# Patient Record
Sex: MALE | Race: WHITE | NOT HISPANIC OR LATINO | URBAN - METROPOLITAN AREA
[De-identification: names, ages, dates, MRNs, and addresses within clinical notes are randomized per-mention and may not be internally consistent; named-entity substitution may affect disease eponyms.]

---

## 2017-01-16 RX ORDER — LISINOPRIL 20 MG/1
TABLET ORAL
Qty: 90 TABLET | Refills: 0 | Status: SHIPPED | OUTPATIENT
Start: 2017-01-16 | End: 2017-04-14

## 2017-01-20 ENCOUNTER — WALK IN (OUTPATIENT)
Dept: URGENT CARE | Age: 42
End: 2017-01-20

## 2017-01-20 VITALS
HEART RATE: 78 BPM | SYSTOLIC BLOOD PRESSURE: 120 MMHG | WEIGHT: 285 LBS | BODY MASS INDEX: 37.77 KG/M2 | TEMPERATURE: 98.1 F | OXYGEN SATURATION: 96 % | DIASTOLIC BLOOD PRESSURE: 76 MMHG | HEIGHT: 73 IN | RESPIRATION RATE: 18 BRPM

## 2017-01-20 DIAGNOSIS — S61.412A LACERATION OF LEFT HAND, INITIAL ENCOUNTER: Primary | ICD-10-CM

## 2017-01-20 PROCEDURE — 12041 INTMD RPR N-HF/GENIT 2.5CM/<: CPT | Performed by: FAMILY MEDICINE

## 2017-01-20 PROCEDURE — 99204 OFFICE O/P NEW MOD 45 MIN: CPT | Performed by: FAMILY MEDICINE

## 2017-01-20 RX ORDER — LIDOCAINE HYDROCHLORIDE 10 MG/ML
2 INJECTION, SOLUTION INFILTRATION; PERINEURAL ONCE
Status: COMPLETED | OUTPATIENT
Start: 2017-01-20 | End: 2017-01-20

## 2017-01-20 RX ORDER — BACITRACIN ZINC AND POLYMYXIN B SULFATE 500; 1000 [USP'U]/G; [USP'U]/G
OINTMENT TOPICAL PRN
Status: DISCONTINUED | OUTPATIENT
Start: 2017-01-20 | End: 2017-02-08 | Stop reason: HOSPADM

## 2017-01-20 RX ADMIN — LIDOCAINE HYDROCHLORIDE 20 MG: 10 INJECTION, SOLUTION INFILTRATION; PERINEURAL at 10:22

## 2017-01-20 RX ADMIN — BACITRACIN ZINC AND POLYMYXIN B SULFATE: 500; 1000 OINTMENT TOPICAL at 10:21

## 2017-01-24 ENCOUNTER — TELEPHONE (OUTPATIENT)
Dept: TELEHEALTH | Age: 42
End: 2017-01-24

## 2017-02-11 ENCOUNTER — OFFICE VISIT (OUTPATIENT)
Dept: FAMILY MEDICINE CLINIC | Facility: CLINIC | Age: 42
End: 2017-02-11

## 2017-02-11 VITALS
TEMPERATURE: 99 F | RESPIRATION RATE: 18 BRPM | BODY MASS INDEX: 38.19 KG/M2 | WEIGHT: 282 LBS | DIASTOLIC BLOOD PRESSURE: 84 MMHG | HEIGHT: 72 IN | SYSTOLIC BLOOD PRESSURE: 131 MMHG | HEART RATE: 72 BPM

## 2017-02-11 DIAGNOSIS — Z00.00 ROUTINE HEALTH MAINTENANCE: Primary | ICD-10-CM

## 2017-02-11 DIAGNOSIS — Z80.0 FAMILY HISTORY OF MALIGNANT NEOPLASM OF COLON IN FIRST DEGREE RELATIVE DIAGNOSED WHEN YOUNGER THAN 60 YEARS OF AGE: ICD-10-CM

## 2017-02-11 DIAGNOSIS — R73.09 ELEVATED GLUCOSE: ICD-10-CM

## 2017-02-11 LAB
ALBUMIN SERPL-MCNC: 4.3 G/DL (ref 3.5–4.8)
ALP LIVER SERPL-CCNC: 65 U/L (ref 45–117)
ALT SERPL-CCNC: 62 U/L (ref 17–63)
AST SERPL-CCNC: 24 U/L (ref 15–41)
BILIRUB SERPL-MCNC: 0.5 MG/DL (ref 0.1–2)
BUN BLD-MCNC: 17 MG/DL (ref 8–20)
CALCIUM BLD-MCNC: 9.3 MG/DL (ref 8.3–10.3)
CHLORIDE: 105 MMOL/L (ref 101–111)
CHOLEST SMN-MCNC: 162 MG/DL (ref ?–200)
CO2: 26 MMOL/L (ref 22–32)
CREAT BLD-MCNC: 0.85 MG/DL (ref 0.7–1.3)
ERYTHROCYTE [DISTWIDTH] IN BLOOD BY AUTOMATED COUNT: 11.3 % (ref 11.5–16)
GLUCOSE BLD-MCNC: 140 MG/DL (ref 70–99)
HCT VFR BLD AUTO: 44.9 % (ref 37–53)
HDLC SERPL-MCNC: 38 MG/DL (ref 45–?)
HDLC SERPL: 4.26 {RATIO} (ref ?–4.97)
HGB BLD-MCNC: 15.6 G/DL (ref 13–17)
LDLC SERPL CALC-MCNC: 89 MG/DL (ref ?–130)
M PROTEIN MFR SERPL ELPH: 7.4 G/DL (ref 6.1–8.3)
MCH RBC QN AUTO: 31.8 PG (ref 27–33.2)
MCHC RBC AUTO-ENTMCNC: 34.7 G/DL (ref 31–37)
MCV RBC AUTO: 91.4 FL (ref 80–99)
NONHDLC SERPL-MCNC: 124 MG/DL (ref ?–130)
PLATELET # BLD AUTO: 174 10(3)UL (ref 150–450)
POTASSIUM SERPL-SCNC: 4.2 MMOL/L (ref 3.6–5.1)
RBC # BLD AUTO: 4.91 X10(6)UL (ref 4.3–5.7)
RED CELL DISTRIBUTION WIDTH-SD: 37.5 FL (ref 35.1–46.3)
SODIUM SERPL-SCNC: 136 MMOL/L (ref 136–144)
TRIGLYCERIDES: 177 MG/DL (ref ?–150)
TSI SER-ACNC: 1.04 MIU/ML (ref 0.35–5.5)
VLDL: 35 MG/DL (ref 5–40)
WBC # BLD AUTO: 4.5 X10(3) UL (ref 4–13)

## 2017-02-11 PROCEDURE — 80053 COMPREHEN METABOLIC PANEL: CPT | Performed by: FAMILY MEDICINE

## 2017-02-11 PROCEDURE — 80050 GENERAL HEALTH PANEL: CPT | Performed by: FAMILY MEDICINE

## 2017-02-11 PROCEDURE — 36415 COLL VENOUS BLD VENIPUNCTURE: CPT | Performed by: FAMILY MEDICINE

## 2017-02-11 PROCEDURE — 84443 ASSAY THYROID STIM HORMONE: CPT | Performed by: FAMILY MEDICINE

## 2017-02-11 PROCEDURE — 80061 LIPID PANEL: CPT | Performed by: FAMILY MEDICINE

## 2017-02-11 PROCEDURE — 85027 COMPLETE CBC AUTOMATED: CPT | Performed by: FAMILY MEDICINE

## 2017-02-11 PROCEDURE — 99396 PREV VISIT EST AGE 40-64: CPT | Performed by: FAMILY MEDICINE

## 2017-02-11 PROCEDURE — 83036 HEMOGLOBIN GLYCOSYLATED A1C: CPT | Performed by: FAMILY MEDICINE

## 2017-02-11 NOTE — PROGRESS NOTES
Jana Krause is a 42year old male who presents for a complete physical exam.   HPI:   Pt complains of nothing at this time, he is in need of labs and some screening. He did manage to lose weight but not enough, also he relays a family Hx of colon Ca  In hi Relation Age of Onset   • Diabetes Father    • Cancer Father    • Diabetes Mother       Social History:    Smoking Status: Never Smoker                      Alcohol Use: Yes              Occ: Iron workker. : . Children: 3. Exercise: none. CMP, CBC and TSH . Pt referred for screening colonoscopy. Pt info handouts given for: exercise, low fat diet, testicular self exam and prostate cancer screening. The patient indicates understanding of these issues and agrees to the plan.   The patient is as

## 2017-02-13 ENCOUNTER — TELEPHONE (OUTPATIENT)
Dept: FAMILY MEDICINE CLINIC | Facility: CLINIC | Age: 42
End: 2017-02-13

## 2017-02-13 DIAGNOSIS — R73.09 ELEVATED GLUCOSE: Primary | ICD-10-CM

## 2017-02-13 LAB
EST. AVERAGE GLUCOSE BLD GHB EST-MCNC: 126 MG/DL (ref 68–126)
HBA1C MFR BLD HPLC: 6 % (ref ?–5.7)

## 2017-02-15 ENCOUNTER — TELEPHONE (OUTPATIENT)
Dept: FAMILY MEDICINE CLINIC | Facility: CLINIC | Age: 42
End: 2017-02-15

## 2017-02-15 DIAGNOSIS — R73.09 ELEVATED HEMOGLOBIN A1C: Primary | ICD-10-CM

## 2017-02-15 DIAGNOSIS — E78.2 ELEVATED TRIGLYCERIDES WITH HIGH CHOLESTEROL: ICD-10-CM

## 2017-02-15 NOTE — TELEPHONE ENCOUNTER
----- Message from Berta Cook DO sent at 2/15/2017 10:16 AM CST -----  Notified of results his lipids are better, adn the rest of his labs look good, but his BS is in the prediabetic range, and if he gets his weight down and diets and gets some exercise

## 2017-03-16 RX ORDER — ACYCLOVIR 200 MG/1
CAPSULE ORAL
Qty: 360 CAPSULE | Refills: 3 | Status: SHIPPED | OUTPATIENT
Start: 2017-03-16

## 2017-04-14 RX ORDER — LISINOPRIL 20 MG/1
TABLET ORAL
Qty: 90 TABLET | Refills: 3 | Status: SHIPPED | OUTPATIENT
Start: 2017-04-14 | End: 2018-05-30

## 2017-04-14 NOTE — TELEPHONE ENCOUNTER
Last refill: 1- #90 with 0 refills    Last Visit: 2-    Next Visit: No future appointments. Forward to Dr. Johnny Ayala please advise on refills. Thanks.

## 2017-05-15 ENCOUNTER — TELEPHONE (OUTPATIENT)
Dept: FAMILY MEDICINE CLINIC | Facility: CLINIC | Age: 42
End: 2017-05-15

## 2018-05-31 RX ORDER — LISINOPRIL 20 MG/1
TABLET ORAL
Qty: 30 TABLET | Refills: 0 | Status: SHIPPED | OUTPATIENT
Start: 2018-05-31 | End: 2019-01-18

## 2018-05-31 NOTE — TELEPHONE ENCOUNTER
Last refill on Lisinopril #90 with 3 refills on 4 14 2017   Last OV on 2 11 2017 - Blood pressure 131/84  No future appointments scheduled

## 2018-06-16 ENCOUNTER — OFFICE VISIT (OUTPATIENT)
Dept: FAMILY MEDICINE CLINIC | Facility: CLINIC | Age: 43
End: 2018-06-16

## 2018-06-16 VITALS
BODY MASS INDEX: 37.49 KG/M2 | HEIGHT: 73 IN | TEMPERATURE: 98 F | WEIGHT: 282.88 LBS | HEART RATE: 72 BPM | SYSTOLIC BLOOD PRESSURE: 112 MMHG | RESPIRATION RATE: 18 BRPM | DIASTOLIC BLOOD PRESSURE: 82 MMHG

## 2018-06-16 DIAGNOSIS — Z80.0 FAMILY HISTORY OF MALIGNANT NEOPLASM OF COLON IN FIRST DEGREE RELATIVE DIAGNOSED WHEN YOUNGER THAN 60 YEARS OF AGE: ICD-10-CM

## 2018-06-16 DIAGNOSIS — R73.09 ELEVATED GLUCOSE: ICD-10-CM

## 2018-06-16 DIAGNOSIS — Z00.00 ROUTINE HEALTH MAINTENANCE: Primary | ICD-10-CM

## 2018-06-16 DIAGNOSIS — E55.9 VITAMIN D DEFICIENCY: ICD-10-CM

## 2018-06-16 PROCEDURE — 80061 LIPID PANEL: CPT | Performed by: FAMILY MEDICINE

## 2018-06-16 PROCEDURE — 82306 VITAMIN D 25 HYDROXY: CPT | Performed by: FAMILY MEDICINE

## 2018-06-16 PROCEDURE — 83036 HEMOGLOBIN GLYCOSYLATED A1C: CPT | Performed by: FAMILY MEDICINE

## 2018-06-16 PROCEDURE — 84439 ASSAY OF FREE THYROXINE: CPT | Performed by: FAMILY MEDICINE

## 2018-06-16 PROCEDURE — 36415 COLL VENOUS BLD VENIPUNCTURE: CPT | Performed by: FAMILY MEDICINE

## 2018-06-16 PROCEDURE — 99396 PREV VISIT EST AGE 40-64: CPT | Performed by: FAMILY MEDICINE

## 2018-06-16 PROCEDURE — 80050 GENERAL HEALTH PANEL: CPT | Performed by: FAMILY MEDICINE

## 2018-06-16 NOTE — PROGRESS NOTES
Oumar Quesada is a 36year old male who presents for a complete physical exam.   HPI:   Pt complains of Vandana Sachi is here,for his CPX, he has had an issue with cramping thao[ecially at night and during the day as well , he has been adding salt to his food, and h hyperlipidemia    • Unspecified essential hypertension       Past Surgical History:  No date: HERNIA SURGERY      Comment: R INGUINAL HERNIA AS A KID  No date: OTHER SURGICAL HISTORY      Comment: right shoulder surgery   Family History   Problem Relation back  EXTREMITIES: no cyanosis, clubbing or edema  NEURO: Oriented times three,cranial nerves are intact,motor and sensory are grossly intact    ASSESSMENT AND PLAN:   Huma Abdulkadir is a 36year old male who presents for a complete physical exam.  Pt's bandar

## 2018-06-16 NOTE — PROGRESS NOTES
1 mint . , 1 gold, 1 lavender tube collected from L AC using straight needle and 1 attempt    Pt tolerated and was sent home in stable condition

## 2018-06-18 ENCOUNTER — TELEPHONE (OUTPATIENT)
Dept: FAMILY MEDICINE CLINIC | Facility: CLINIC | Age: 43
End: 2018-06-18

## 2018-06-18 DIAGNOSIS — R73.09 ELEVATED GLUCOSE: Primary | ICD-10-CM

## 2018-06-18 DIAGNOSIS — E78.5 ELEVATED LIPIDS: ICD-10-CM

## 2018-06-18 NOTE — TELEPHONE ENCOUNTER
----- Message from Audra Egan DO sent at 6/18/2018  5:45 PM CDT -----  Can notify Ann Linda that his BS is creeping up just a bit more than it was last year, along with that so is his TG, last year 177, this year 215. The 2 go hand in hand.  I know he  had l

## 2018-06-19 NOTE — TELEPHONE ENCOUNTER
Pt was advised of results- verbalized understanding. PT states at this time he would rather not take medication but work on Diet and exercise.     Future orders placed

## 2018-07-21 ENCOUNTER — LAB ENCOUNTER (OUTPATIENT)
Dept: LAB | Age: 43
End: 2018-07-21
Attending: FAMILY MEDICINE
Payer: COMMERCIAL

## 2018-07-21 ENCOUNTER — OFFICE VISIT (OUTPATIENT)
Dept: FAMILY MEDICINE CLINIC | Facility: CLINIC | Age: 43
End: 2018-07-21
Payer: COMMERCIAL

## 2018-07-21 VITALS
HEART RATE: 86 BPM | SYSTOLIC BLOOD PRESSURE: 118 MMHG | RESPIRATION RATE: 12 BRPM | OXYGEN SATURATION: 96 % | BODY MASS INDEX: 36 KG/M2 | DIASTOLIC BLOOD PRESSURE: 82 MMHG | TEMPERATURE: 98 F | WEIGHT: 275 LBS

## 2018-07-21 DIAGNOSIS — R19.7 DIARRHEA OF PRESUMED INFECTIOUS ORIGIN: ICD-10-CM

## 2018-07-21 DIAGNOSIS — R11.0 NAUSEA: ICD-10-CM

## 2018-07-21 DIAGNOSIS — R50.9 FEVER, UNSPECIFIED FEVER CAUSE: ICD-10-CM

## 2018-07-21 DIAGNOSIS — R19.7 DIARRHEA OF PRESUMED INFECTIOUS ORIGIN: Primary | ICD-10-CM

## 2018-07-21 PROCEDURE — 87507 IADNA-DNA/RNA PROBE TQ 12-25: CPT

## 2018-07-21 PROCEDURE — 99214 OFFICE O/P EST MOD 30 MIN: CPT | Performed by: FAMILY MEDICINE

## 2018-07-21 RX ORDER — LISINOPRIL 20 MG/1
20 TABLET ORAL DAILY
COMMUNITY
End: 2019-06-17

## 2018-07-21 RX ORDER — METRONIDAZOLE 500 MG/1
500 TABLET ORAL 3 TIMES DAILY
Qty: 30 TABLET | Refills: 0 | Status: SHIPPED | OUTPATIENT
Start: 2018-07-21 | End: 2018-07-31

## 2018-07-21 NOTE — PROGRESS NOTES
Vane Sin is a 39year old male. HPI:   Has had diarrhea for 2 weeks now, did not eat at [facility], but has been eating a lot of salads, had a soft stool this AM, nut no blood ion the stool, has been using probiotics for the diarrhea.  No blood in the rashes,no suspicious lesions  HEENT: atraumatic, normocephalic,ears and throat are clear  NECK: supple,no adenopathy,no bruits  LUNGS: clear to auscultation  CARDIO: RRR without murmur  GI: active  BS's,no masses, HSM generalized  tenderness  EXTREMITIES:

## 2018-07-22 LAB
ADENOVIRUS F 40/41 PCR: NEGATIVE
ASTROVIRUS PCR: NEGATIVE
C CAYETANENSIS DNA SPEC QL NAA+PROBE: POSITIVE
C. DIFFICILE TOXIN A/B PCR: NEGATIVE
CAMPY SP DNA.DIARRHEA STL QL NAA+PROBE: NEGATIVE
CRYPTOSP DNA SPEC QL NAA+PROBE: NEGATIVE
EAEC PAA PLAS AGGR+AATA ST NAA+NON-PRB: NEGATIVE
EC STX1+STX2 + H7 FLIC SPEC NAA+PROBE: NEGATIVE
ENTAMOEBA HISTOLYTICA PCR: NEGATIVE
EPEC EAE GENE STL QL NAA+NON-PROBE: NEGATIVE
ETEC LTA+ST1A+ST1B TOX ST NAA+NON-PROBE: NEGATIVE
GIARDIA LAMBLIA PCR: NEGATIVE
NOROVIRUS GI/GII PCR: NEGATIVE
P SHIGELLOIDES DNA STL QL NAA+PROBE: NEGATIVE
ROTAVIRUS A PCR: NEGATIVE
SALMONELLA DNA SPEC QL NAA+PROBE: NEGATIVE
SAPOVIRUS PCR: NEGATIVE
SHIGELLA SP+EIEC IPAH ST NAA+NON-PROBE: NEGATIVE
V CHOLERAE DNA SPEC QL NAA+PROBE: NEGATIVE
VIBRIO DNA SPEC NAA+PROBE: NEGATIVE
YERSINIA DNA SPEC NAA+PROBE: NEGATIVE

## 2018-07-23 ENCOUNTER — TELEPHONE (OUTPATIENT)
Dept: FAMILY MEDICINE CLINIC | Facility: CLINIC | Age: 43
End: 2018-07-23

## 2018-07-23 RX ORDER — SULFAMETHOXAZOLE AND TRIMETHOPRIM 800; 160 MG/1; MG/1
1 TABLET ORAL 2 TIMES DAILY
Qty: 20 TABLET | Refills: 0 | Status: SHIPPED | OUTPATIENT
Start: 2018-07-23 | End: 2018-08-02

## 2018-07-23 NOTE — TELEPHONE ENCOUNTER
Faxed GI Stool Panel by PCR results to Sandoval Babin at [facility] at [phone]. Per Sandoval Babin - she will report to the state.

## 2018-07-23 NOTE — TELEPHONE ENCOUNTER
----- Message from María Srivastava DO sent at 7/23/2018  8:12 AM CDT -----  Notified of results, and he is positive for cyclospora, he needs to stop the metronidazole he is on and switch to Bactrim  DS, 1 po bid for 7 days

## 2018-07-23 NOTE — TELEPHONE ENCOUNTER
Pt advised that script was not sent to local pharmacy but to mail order.,    Script called in per verbal order by Miguelina Mace to [facility] in [employer] in [place]. LM for pt that script was sent I.  Office number provided

## 2018-10-22 ENCOUNTER — TELEPHONE (OUTPATIENT)
Dept: FAMILY MEDICINE CLINIC | Facility: CLINIC | Age: 43
End: 2018-10-22

## 2018-10-22 NOTE — TELEPHONE ENCOUNTER
Letter mailed to patient reminding him he is due for labs.     Lab Frequency Next Occurrence   HEMOGLOBIN A1C Once 10/19/2018   LIPID PANEL Once 10/19/2018

## 2018-11-20 ENCOUNTER — PATIENT OUTREACH (OUTPATIENT)
Dept: FAMILY MEDICINE CLINIC | Facility: CLINIC | Age: 43
End: 2018-11-20

## 2019-01-18 RX ORDER — LISINOPRIL 20 MG/1
TABLET ORAL
Qty: 90 TABLET | Refills: 1 | Status: SHIPPED | OUTPATIENT
Start: 2019-01-18 | End: 2019-02-17 | Stop reason: WASHOUT

## 2019-06-01 ENCOUNTER — NURSE ONLY (OUTPATIENT)
Dept: FAMILY MEDICINE CLINIC | Facility: CLINIC | Age: 44
End: 2019-06-01
Payer: COMMERCIAL

## 2019-06-01 DIAGNOSIS — E78.5 ELEVATED LIPIDS: ICD-10-CM

## 2019-06-01 DIAGNOSIS — R73.09 ELEVATED GLUCOSE: ICD-10-CM

## 2019-06-01 PROCEDURE — 36415 COLL VENOUS BLD VENIPUNCTURE: CPT | Performed by: FAMILY MEDICINE

## 2019-06-01 PROCEDURE — 83036 HEMOGLOBIN GLYCOSYLATED A1C: CPT | Performed by: FAMILY MEDICINE

## 2019-06-01 PROCEDURE — 80061 LIPID PANEL: CPT | Performed by: FAMILY MEDICINE

## 2019-06-01 NOTE — PROGRESS NOTES
Mint and lav tubes drawn from left arm with 21g butterfly needle x1. Pt kourtney well.  Pt left the clinic in stable condition

## 2019-06-15 ENCOUNTER — TELEPHONE (OUTPATIENT)
Dept: FAMILY MEDICINE CLINIC | Facility: CLINIC | Age: 44
End: 2019-06-15

## 2019-06-15 NOTE — TELEPHONE ENCOUNTER
----- Message from Reina Anthony DO sent at 6/15/2019  8:14 AM CDT -----  Please try and contact Abel Pedersen, to notify him he is now officially a type 2 diabetic, and we need to sit down and talk about getting his BS back under control, and sit down with a diab

## 2019-06-17 ENCOUNTER — OFFICE VISIT (OUTPATIENT)
Dept: FAMILY MEDICINE CLINIC | Facility: CLINIC | Age: 44
End: 2019-06-17
Payer: COMMERCIAL

## 2019-06-17 VITALS
SYSTOLIC BLOOD PRESSURE: 126 MMHG | HEIGHT: 72 IN | DIASTOLIC BLOOD PRESSURE: 84 MMHG | HEART RATE: 88 BPM | TEMPERATURE: 98 F | RESPIRATION RATE: 22 BRPM | BODY MASS INDEX: 38.55 KG/M2 | WEIGHT: 284.63 LBS

## 2019-06-17 DIAGNOSIS — E66.9 DIABETES MELLITUS TYPE 2 IN OBESE (HCC): ICD-10-CM

## 2019-06-17 DIAGNOSIS — E78.2 MIXED HYPERLIPIDEMIA: ICD-10-CM

## 2019-06-17 DIAGNOSIS — E66.1 CLASS 2 DRUG-INDUCED OBESITY WITH SERIOUS COMORBIDITY AND BODY MASS INDEX (BMI) OF 37.0 TO 37.9 IN ADULT: Primary | ICD-10-CM

## 2019-06-17 DIAGNOSIS — I10 ESSENTIAL HYPERTENSION: ICD-10-CM

## 2019-06-17 DIAGNOSIS — E11.69 DIABETES MELLITUS TYPE 2 IN OBESE (HCC): ICD-10-CM

## 2019-06-17 PROCEDURE — 99214 OFFICE O/P EST MOD 30 MIN: CPT | Performed by: FAMILY MEDICINE

## 2019-06-17 RX ORDER — LISINOPRIL 20 MG/1
20 TABLET ORAL DAILY
Qty: 90 TABLET | Refills: 2 | Status: SHIPPED | OUTPATIENT
Start: 2019-06-17 | End: 2020-05-05

## 2019-06-17 RX ORDER — LISINOPRIL 20 MG/1
1 TABLET ORAL DAILY
COMMUNITY
Start: 2019-06-03 | End: 2019-06-17

## 2019-06-17 NOTE — PROGRESS NOTES
Sandy Alvarado is a 39year old male.   HPI:   Beatriz Schwab is here for discuisson of his labs done recently that showed he is now a full blown type 2 diabetic, he has gained a considerable amount of weight, he is not exercising outside of his  Job, and admits his d lesions  HEENT: atraumatic, normocephalic,ears and throat are clear  NECK: supple,no adenopathy,no bruits  LUNGS: clear to auscultation  CARDIO: RRR without murmur  GI: good BS's,no masses, HSM or tenderness  EXTREMITIES: no cyanosis, clubbing or edema, Bi

## 2019-07-18 NOTE — TELEPHONE ENCOUNTER
LOV: 6/17/19   Last Refill: 6/17/19 #60 0 RF    Future Appointments   Date Time Provider Kacie Sosa   9/16/2019  4:30 PM sIabel Courtney DO [facility]FFY Garcia     Last A1C: 6/1/19 7.4

## 2019-07-18 NOTE — TELEPHONE ENCOUNTER
Spouse called, pt was in earlier today and she mentioned he needed a new script for refills on metFORMIN HCl 500 MG Oral Tab and said she was told that there are refills on it. Spouse said per the pharmacy there are no more refills.    Pharmacy-[facility] [employer]

## 2019-09-16 ENCOUNTER — OFFICE VISIT (OUTPATIENT)
Dept: FAMILY MEDICINE CLINIC | Facility: CLINIC | Age: 44
End: 2019-09-16
Payer: COMMERCIAL

## 2019-09-16 VITALS
DIASTOLIC BLOOD PRESSURE: 82 MMHG | SYSTOLIC BLOOD PRESSURE: 120 MMHG | HEART RATE: 76 BPM | BODY MASS INDEX: 38 KG/M2 | WEIGHT: 277.19 LBS | TEMPERATURE: 98 F | RESPIRATION RATE: 22 BRPM

## 2019-09-16 DIAGNOSIS — E11.9 TYPE 2 DIABETES MELLITUS TREATED WITHOUT INSULIN (HCC): ICD-10-CM

## 2019-09-16 DIAGNOSIS — E78.2 MIXED HYPERLIPIDEMIA: ICD-10-CM

## 2019-09-16 DIAGNOSIS — E66.1 CLASS 2 DRUG-INDUCED OBESITY WITH SERIOUS COMORBIDITY AND BODY MASS INDEX (BMI) OF 37.0 TO 37.9 IN ADULT: Primary | ICD-10-CM

## 2019-09-16 LAB
ALBUMIN SERPL-MCNC: 4.6 G/DL (ref 3.4–5)
ALBUMIN/GLOB SERPL: 1.4 {RATIO} (ref 1–2)
ALP LIVER SERPL-CCNC: 69 U/L (ref 45–117)
ALT SERPL-CCNC: 86 U/L (ref 16–61)
ANION GAP SERPL CALC-SCNC: 7 MMOL/L (ref 0–18)
AST SERPL-CCNC: 38 U/L (ref 15–37)
BILIRUB SERPL-MCNC: 0.4 MG/DL (ref 0.1–2)
BUN BLD-MCNC: 15 MG/DL (ref 7–18)
BUN/CREAT SERPL: 17 (ref 10–20)
CALCIUM BLD-MCNC: 9.5 MG/DL (ref 8.5–10.1)
CHLORIDE SERPL-SCNC: 103 MMOL/L (ref 98–112)
CO2 SERPL-SCNC: 29 MMOL/L (ref 21–32)
CREAT BLD-MCNC: 0.88 MG/DL (ref 0.7–1.3)
GLOBULIN PLAS-MCNC: 3.2 G/DL (ref 2.8–4.4)
GLUCOSE BLD-MCNC: 122 MG/DL (ref 70–99)
M PROTEIN MFR SERPL ELPH: 7.8 G/DL (ref 6.4–8.2)
OSMOLALITY SERPL CALC.SUM OF ELEC: 290 MOSM/KG (ref 275–295)
POTASSIUM SERPL-SCNC: 4.7 MMOL/L (ref 3.5–5.1)
SODIUM SERPL-SCNC: 139 MMOL/L (ref 136–145)

## 2019-09-16 PROCEDURE — 99214 OFFICE O/P EST MOD 30 MIN: CPT | Performed by: FAMILY MEDICINE

## 2019-09-16 PROCEDURE — 36415 COLL VENOUS BLD VENIPUNCTURE: CPT | Performed by: FAMILY MEDICINE

## 2019-09-16 PROCEDURE — 80053 COMPREHEN METABOLIC PANEL: CPT | Performed by: FAMILY MEDICINE

## 2019-09-16 PROCEDURE — 83036 HEMOGLOBIN GLYCOSYLATED A1C: CPT | Performed by: FAMILY MEDICINE

## 2019-09-16 NOTE — PROGRESS NOTES
Mckay Dockery is a 38year old male.   HPI:   Art Márquez is here for follow up on his BS and his HTN, he was diagnosed via labs, he has lost 7 or more pounds ha his work boots on, he has had some issues with diarrhea on the metformin and worse when he drinks alco murmur  GI: good BS's,no masses, HSM or tenderness  EXTREMITIES: no cyanosis, clubbing or edema    ASSESSMENT AND PLAN:     Class 2 drug-induced obesity with serious comorbidity and body mass index (bmi) of 37.0 to 37.9 in adult  (primary encounter diagnos

## 2019-09-17 ENCOUNTER — TELEPHONE (OUTPATIENT)
Dept: FAMILY MEDICINE CLINIC | Facility: CLINIC | Age: 44
End: 2019-09-17

## 2019-09-17 LAB
EST. AVERAGE GLUCOSE BLD GHB EST-MCNC: 146 MG/DL (ref 68–126)
HBA1C MFR BLD HPLC: 6.7 % (ref ?–5.7)

## 2019-09-17 NOTE — TELEPHONE ENCOUNTER
----- Message from Omayra Brunson DO sent at 9/17/2019  8:03 AM CDT -----  Can notify Harriet Hernándezheather that his BS control is better, he dropped the number almost a full point which is very good, so congratulations, lets keep up the good work, he loses some more weigh

## 2019-09-20 NOTE — TELEPHONE ENCOUNTER
Patient is returning a nurse call from [employer] regarding lab results. Please return patients call.

## 2019-09-20 NOTE — TELEPHONE ENCOUNTER
Patient advised of the blood work results and recommendations per Dr. Nova Leyden. Recall done in the computer.

## 2019-10-15 NOTE — TELEPHONE ENCOUNTER
Last refill: 7- #60 with 2 refills  Last Visit: 9-  Next Visit: No future appointments. Forward to Dr. Yasmin Morales please advise on refills. Thanks.

## 2019-12-09 ENCOUNTER — HOSPITAL (OUTPATIENT)
Dept: OTHER | Age: 44
End: 2019-12-09

## 2020-01-17 ENCOUNTER — OFFICE VISIT (OUTPATIENT)
Dept: FAMILY MEDICINE CLINIC | Facility: CLINIC | Age: 45
End: 2020-01-17
Payer: COMMERCIAL

## 2020-01-17 VITALS
TEMPERATURE: 99 F | SYSTOLIC BLOOD PRESSURE: 120 MMHG | DIASTOLIC BLOOD PRESSURE: 82 MMHG | OXYGEN SATURATION: 98 % | BODY MASS INDEX: 38 KG/M2 | WEIGHT: 277 LBS | HEART RATE: 95 BPM | RESPIRATION RATE: 16 BRPM

## 2020-01-17 DIAGNOSIS — R68.89 FLU-LIKE SYMPTOMS: Primary | ICD-10-CM

## 2020-01-17 DIAGNOSIS — J10.1 INFLUENZA A: ICD-10-CM

## 2020-01-17 LAB
POCT INFLUENZA A: POSITIVE
POCT INFLUENZA B: NEGATIVE

## 2020-01-17 PROCEDURE — 99213 OFFICE O/P EST LOW 20 MIN: CPT | Performed by: PHYSICIAN ASSISTANT

## 2020-01-17 PROCEDURE — 87502 INFLUENZA DNA AMP PROBE: CPT | Performed by: PHYSICIAN ASSISTANT

## 2020-01-17 NOTE — PROGRESS NOTES
CHIEF COMPLAINT:   Patient presents with:  Fever: body aches/cough/congestion x 3 days.  max temp 103, ST, headache, sweats     HPI:   Danyelle Peña is a 38year old male who with a hx of abrupt onset of sweats, body aches, headache, fever up to 103, cough, GI: denies N/V/C or abdominal pain  NEURO: +headaches  EXAM:   /82 (BP Location: Left arm, Patient Position: Sitting, Cuff Size: large)   Pulse 95   Temp 98.6 °F (37 °C) (Tympanic)   Resp 16   Wt 277 lb (125.6 kg)   SpO2 98%   BMI 37.57 kg/m²   GENER No prescriptions requested or ordered in this encounter         Patient Instructions   Please follow up with your PCP if no improvement within 5-7 days. Go directly to the ER for any acute worsening of symptoms.      Influenza (Adult)    Influenza is also · Stay home until your fever has been gone for at least 24 hours without using medicine to reduce fever. Follow-up care  Follow up with your healthcare provider, or as advised, if you are not getting better over the next week.   If you are age 72 or older,

## 2020-05-05 RX ORDER — LISINOPRIL 20 MG/1
TABLET ORAL
Qty: 90 TABLET | Refills: 3 | Status: SHIPPED | OUTPATIENT
Start: 2020-05-05 | End: 2020-09-12

## 2020-05-05 NOTE — TELEPHONE ENCOUNTER
Hypertension Medications Protocol Failed5/4 11:58 PM   Appointment in past 6 or next 3 months    CMP or BMP in past 12 months    Last serum creatinine< 2.0     Last refilled 6/17/19 #90 with 2 refills  LOV & CMP 9/16/19  Creatinine (0.88)  No future appt

## 2020-08-07 NOTE — TELEPHONE ENCOUNTER
Diabetic Medication Protocol Failed8/7 9:40 AM   HgBA1C procedure resulted in past 6 months    Last HgBA1C < 7.5    Appointment in past 6 or next 3 months    Microalbumin procedure in past 12 months or taking ACE/ARB     Last A1c on 9 16 2019 -6.7  Last OV

## 2020-08-07 NOTE — TELEPHONE ENCOUNTER
Patient has been notified, verbalized understanding of information. Denies further questions. Pt will call back once he is in front of his calender.

## 2020-09-12 ENCOUNTER — OFFICE VISIT (OUTPATIENT)
Dept: FAMILY MEDICINE CLINIC | Facility: CLINIC | Age: 45
End: 2020-09-12
Payer: COMMERCIAL

## 2020-09-12 VITALS
SYSTOLIC BLOOD PRESSURE: 116 MMHG | OXYGEN SATURATION: 99 % | WEIGHT: 277.25 LBS | HEART RATE: 78 BPM | TEMPERATURE: 97 F | BODY MASS INDEX: 38 KG/M2 | DIASTOLIC BLOOD PRESSURE: 70 MMHG | RESPIRATION RATE: 20 BRPM

## 2020-09-12 DIAGNOSIS — E11.65 UNCONTROLLED TYPE 2 DIABETES MELLITUS WITH HYPERGLYCEMIA (HCC): ICD-10-CM

## 2020-09-12 DIAGNOSIS — E78.2 MIXED HYPERLIPIDEMIA: ICD-10-CM

## 2020-09-12 DIAGNOSIS — I10 ESSENTIAL HYPERTENSION: Primary | ICD-10-CM

## 2020-09-12 LAB
ALBUMIN SERPL-MCNC: 4.2 G/DL (ref 3.4–5)
ALBUMIN/GLOB SERPL: 1.4 {RATIO} (ref 1–2)
ALP LIVER SERPL-CCNC: 72 U/L (ref 45–117)
ALT SERPL-CCNC: 106 U/L (ref 16–61)
ANION GAP SERPL CALC-SCNC: 2 MMOL/L (ref 0–18)
AST SERPL-CCNC: 61 U/L (ref 15–37)
BILIRUB SERPL-MCNC: 0.8 MG/DL (ref 0.1–2)
BUN BLD-MCNC: 12 MG/DL (ref 7–18)
BUN/CREAT SERPL: 13.6 (ref 10–20)
CALCIUM BLD-MCNC: 9.3 MG/DL (ref 8.5–10.1)
CHLORIDE SERPL-SCNC: 103 MMOL/L (ref 98–112)
CHOLEST SMN-MCNC: 176 MG/DL (ref ?–200)
CO2 SERPL-SCNC: 30 MMOL/L (ref 21–32)
CREAT BLD-MCNC: 0.88 MG/DL (ref 0.7–1.3)
CREAT UR-SCNC: 110 MG/DL
GLOBULIN PLAS-MCNC: 3.1 G/DL (ref 2.8–4.4)
GLUCOSE BLD-MCNC: 193 MG/DL (ref 70–99)
HDLC SERPL-MCNC: 38 MG/DL (ref 40–59)
LDLC SERPL CALC-MCNC: 66 MG/DL (ref ?–100)
M PROTEIN MFR SERPL ELPH: 7.3 G/DL (ref 6.4–8.2)
MICROALBUMIN UR-MCNC: 0.51 MG/DL
MICROALBUMIN/CREAT 24H UR-RTO: 4.6 UG/MG (ref ?–30)
NONHDLC SERPL-MCNC: 138 MG/DL (ref ?–130)
OSMOLALITY SERPL CALC.SUM OF ELEC: 285 MOSM/KG (ref 275–295)
PATIENT FASTING Y/N/NP: YES
PATIENT FASTING Y/N/NP: YES
POTASSIUM SERPL-SCNC: 4.6 MMOL/L (ref 3.5–5.1)
SODIUM SERPL-SCNC: 135 MMOL/L (ref 136–145)
T4 FREE SERPL-MCNC: 1 NG/DL (ref 0.8–1.7)
TRIGL SERPL-MCNC: 358 MG/DL (ref 30–149)
TSI SER-ACNC: 0.97 MIU/ML (ref 0.36–3.74)
VLDLC SERPL CALC-MCNC: 72 MG/DL (ref 0–30)

## 2020-09-12 PROCEDURE — 84443 ASSAY THYROID STIM HORMONE: CPT | Performed by: FAMILY MEDICINE

## 2020-09-12 PROCEDURE — 3074F SYST BP LT 130 MM HG: CPT | Performed by: FAMILY MEDICINE

## 2020-09-12 PROCEDURE — 84439 ASSAY OF FREE THYROXINE: CPT | Performed by: FAMILY MEDICINE

## 2020-09-12 PROCEDURE — 82570 ASSAY OF URINE CREATININE: CPT | Performed by: FAMILY MEDICINE

## 2020-09-12 PROCEDURE — 80053 COMPREHEN METABOLIC PANEL: CPT | Performed by: FAMILY MEDICINE

## 2020-09-12 PROCEDURE — 36415 COLL VENOUS BLD VENIPUNCTURE: CPT | Performed by: FAMILY MEDICINE

## 2020-09-12 PROCEDURE — 82043 UR ALBUMIN QUANTITATIVE: CPT | Performed by: FAMILY MEDICINE

## 2020-09-12 PROCEDURE — 99214 OFFICE O/P EST MOD 30 MIN: CPT | Performed by: FAMILY MEDICINE

## 2020-09-12 PROCEDURE — 80061 LIPID PANEL: CPT | Performed by: FAMILY MEDICINE

## 2020-09-12 PROCEDURE — 3078F DIAST BP <80 MM HG: CPT | Performed by: FAMILY MEDICINE

## 2020-09-12 RX ORDER — LOSARTAN POTASSIUM 50 MG/1
50 TABLET ORAL DAILY
Qty: 30 TABLET | Refills: 1 | Status: SHIPPED | OUTPATIENT
Start: 2020-09-12 | End: 2020-10-08

## 2020-09-13 NOTE — PROGRESS NOTES
HPI:   Thais Kelley is a 54year old male who presents for recheck of his diabetes. Patient’s FBS have been unknown does not have a glucometer and is not checking his BS readings.  He was in the ER 2 weeks ago during the heat and humidity for dehydration mu 06/16/2018     Malb/Cre Calc   Date Value Ref Range Status   09/12/2020 4.6 <=30.0 ug/mg Final     Comment:     <30 ug/mg creatinine       Normal     ug/mg creatinine   Microalbuminuria   >300 ug/mg creatinine      Albuminuria           Current Outpa good BS's,no masses, HSM or tenderness  EXTREMITIES: no cyanosis, clubbing or edema  NEURO: sensation is intact to monofilament     ASSESSMENT AND PLAN:     Essential hypertension  (primary encounter diagnosis)  Mixed hyperlipidemia  Uncontrolled type 2 di

## 2020-09-14 ENCOUNTER — MED REC SCAN ONLY (OUTPATIENT)
Dept: FAMILY MEDICINE CLINIC | Facility: CLINIC | Age: 45
End: 2020-09-14

## 2020-09-16 ENCOUNTER — TELEPHONE (OUTPATIENT)
Dept: FAMILY MEDICINE CLINIC | Facility: CLINIC | Age: 45
End: 2020-09-16

## 2020-09-16 DIAGNOSIS — E11.65 UNCONTROLLED TYPE 2 DIABETES MELLITUS WITH HYPERGLYCEMIA (HCC): ICD-10-CM

## 2020-09-16 DIAGNOSIS — E78.2 MIXED HYPERLIPIDEMIA: Primary | ICD-10-CM

## 2020-09-16 RX ORDER — ATORVASTATIN CALCIUM 10 MG/1
10 TABLET, FILM COATED ORAL NIGHTLY
Qty: 30 TABLET | Refills: 2 | Status: SHIPPED | OUTPATIENT
Start: 2020-09-16 | End: 2020-10-02 | Stop reason: SINTOL

## 2020-09-16 NOTE — TELEPHONE ENCOUNTER
Pt was advised of results- verbalized understanding    Pt is agreeable to starting a statin    [facility] [place]

## 2020-09-16 NOTE — TELEPHONE ENCOUNTER
----- Message from Audra Egan DO sent at 9/16/2020  1:08 PM CDT -----  Can notify George Louise , or his wife JORGE A that his thyroid was normal his urine looked good,  His cholesterol profile was not terrible except his TG were really high, 358, some of this is

## 2020-10-01 NOTE — TELEPHONE ENCOUNTER
Diabetic Medication Protocol Xohjeu10/01/2020 12:15 AM   HgBA1C procedure resulted in past 6 months Protocol Details    Last HgBA1C < 7.5     Microalbumin procedure in past 12 months or taking ACE/ARB     Appointment in past 6 or next 3 months      Due for

## 2020-10-02 ENCOUNTER — TELEPHONE (OUTPATIENT)
Dept: FAMILY MEDICINE CLINIC | Facility: CLINIC | Age: 45
End: 2020-10-02

## 2020-10-02 RX ORDER — PRAVASTATIN SODIUM 10 MG
10 TABLET ORAL NIGHTLY
Qty: 21 TABLET | Refills: 0 | Status: SHIPPED | OUTPATIENT
Start: 2020-10-02 | End: 2020-10-27

## 2020-10-02 NOTE — TELEPHONE ENCOUNTER
Hussein Millan called in regards to pt Wanting to know which glucometer would be covered by insurance? Also  atorvastatin 10 MG Oral Tab within 2 day started noticing the cramping and it is worse, wanting to know if there is something else he could take?

## 2020-10-02 NOTE — TELEPHONE ENCOUNTER
Wife states she checked with the pharmacy and they didn't know which one would be covered. Can you please send which one you would like.   Wife advised of the information regarding the Pravachol   Prescription ready to sent to [facility]

## 2020-10-07 ENCOUNTER — TELEPHONE (OUTPATIENT)
Dept: ENDOCRINOLOGY CLINIC | Facility: CLINIC | Age: 45
End: 2020-10-07

## 2020-10-07 ENCOUNTER — TELEPHONE (OUTPATIENT)
Dept: FAMILY MEDICINE CLINIC | Facility: CLINIC | Age: 45
End: 2020-10-07

## 2020-10-07 NOTE — TELEPHONE ENCOUNTER
ALEXANDER to return call w/ the new phone number for his [employer] office. When I called it was disconnected.

## 2020-10-07 NOTE — TELEPHONE ENCOUNTER
Routing to provider    Pt was on Atorvastatin and it was switched to pravastatin on 10/1/20    Does pt still need follow up visit?

## 2020-10-07 NOTE — TELEPHONE ENCOUNTER
Patient has an apt on Saturday. He thinks its for a follow up on the pravastatin but patient has stopped taking that because it gave him cramps.  He was prescribed a new medication but he only just started taken that so his wife is wondering if maybe he monica

## 2020-10-08 RX ORDER — LOSARTAN POTASSIUM 50 MG/1
50 TABLET ORAL DAILY
Qty: 90 TABLET | Refills: 2 | Status: SHIPPED | OUTPATIENT
Start: 2020-10-08 | End: 2021-07-29

## 2020-10-08 NOTE — TELEPHONE ENCOUNTER
Contacted pt.'s wife regarding phone number for the fund office. Explained the number on her 's ins. card was disconnected. She said that is no longer the number; It is [phone] & she spoke to Wilmer Williamson.    Informed her I would return her call when I

## 2020-10-09 ENCOUNTER — TELEPHONE (OUTPATIENT)
Dept: ENDOCRINOLOGY CLINIC | Facility: CLINIC | Age: 45
End: 2020-10-09

## 2020-10-09 NOTE — TELEPHONE ENCOUNTER
Ret call to Jasiel & she said it is billed through [employer]. Asked how that is one & she said she didn't know. It will be covered at 80% once $300 deductible is met.

## 2020-10-09 NOTE — TELEPHONE ENCOUNTER
Spoke w/pt. 's wife who provided me with the correct Fund office # to call about testing supplies. [address] office @[phone] & spoke with Janae Robbins. She isn't sure what meter is usually covered; she told me to call pt.'s medical ins.  But not sure of

## 2020-10-27 RX ORDER — PRAVASTATIN SODIUM 10 MG
10 TABLET ORAL NIGHTLY
Qty: 90 TABLET | Refills: 2 | Status: SHIPPED | OUTPATIENT
Start: 2020-10-27 | End: 2021-07-19

## 2020-10-27 NOTE — TELEPHONE ENCOUNTER
Cholesterol Medication Protocol Pehuzn57/26/2020 06:28 PM   ALT < 80 Protocol Details    ALT resulted within past year     Lipid panel within past 12 months     Appointment within past 12 or next 3 months      LOV: 9/12/20   Last Refill: 10/2/20 #21 0 RF

## 2020-11-04 ENCOUNTER — TELEPHONE (OUTPATIENT)
Dept: ENDOCRINOLOGY CLINIC | Facility: CLINIC | Age: 45
End: 2020-11-04

## 2020-11-04 DIAGNOSIS — E11.9 TYPE 2 DIABETES MELLITUS WITHOUT COMPLICATION, WITHOUT LONG-TERM CURRENT USE OF INSULIN (HCC): Primary | ICD-10-CM

## 2020-11-04 RX ORDER — BLOOD SUGAR DIAGNOSTIC
STRIP MISCELLANEOUS
Qty: 200 STRIP | Refills: 3 | Status: SHIPPED | OUTPATIENT
Start: 2020-11-04 | End: 2021-01-29

## 2020-11-04 RX ORDER — LANCETS 30 GAUGE
1 EACH MISCELLANEOUS 2 TIMES DAILY
Qty: 200 EACH | Refills: 3 | Status: SHIPPED | OUTPATIENT
Start: 2020-11-04

## 2020-11-09 RX ORDER — LOSARTAN POTASSIUM 50 MG/1
TABLET ORAL
Qty: 30 TABLET | Refills: 1 | OUTPATIENT
Start: 2020-11-09

## 2020-11-16 ENCOUNTER — NURSE ONLY (OUTPATIENT)
Dept: ENDOCRINOLOGY CLINIC | Facility: CLINIC | Age: 45
End: 2020-11-16
Payer: COMMERCIAL

## 2020-11-16 DIAGNOSIS — E11.9 TYPE 2 DIABETES MELLITUS WITHOUT COMPLICATION, WITHOUT LONG-TERM CURRENT USE OF INSULIN (HCC): Primary | ICD-10-CM

## 2020-11-16 PROCEDURE — G0108 DIAB MANAGE TRN  PER INDIV: HCPCS | Performed by: DIETITIAN, REGISTERED

## 2020-11-18 VITALS — BODY MASS INDEX: 37 KG/M2 | WEIGHT: 272.38 LBS

## 2020-11-18 NOTE — PROGRESS NOTES
Jhonatan Saucedo  : 1975 attended Step 1 Diabetic Education:    Date: 2020  Referring Provider: Dr. Mortimer Felty  Start time: 3:30pm End time: 4:30pm    Wt 272 lb 6.4 oz (123.6 kg)   BMI 36.94 kg/m²     HgbA1C (%)   Date Value   2019 6.7 (H) levels.     Recommendations:    Start monitoring blood glucose twice daily  Start eating something for breakfast, such as snacks he's eating for lunch  Continue Metformin 500 mg 1 tab twice daily  Follow-up in 1 month    Prescriptions sent to preferred phar

## 2020-12-14 ENCOUNTER — NURSE ONLY (OUTPATIENT)
Dept: ENDOCRINOLOGY CLINIC | Facility: CLINIC | Age: 45
End: 2020-12-14
Payer: COMMERCIAL

## 2020-12-14 VITALS — WEIGHT: 286 LBS | BODY MASS INDEX: 39 KG/M2

## 2020-12-14 DIAGNOSIS — E11.9 TYPE 2 DIABETES MELLITUS WITHOUT COMPLICATION, WITHOUT LONG-TERM CURRENT USE OF INSULIN (HCC): Primary | ICD-10-CM

## 2020-12-14 PROCEDURE — G0108 DIAB MANAGE TRN  PER INDIV: HCPCS | Performed by: DIETITIAN, REGISTERED

## 2020-12-15 NOTE — PROGRESS NOTES
Sejal Issa  : 1975 was seen for Diabetic Education Follow up:    Date: 2020  Referring Provider: Dr. Pamela Marina  Start time:3:30pm End time: 4pm    Assessment:     Assessment:  Wt 286 lb   BMI 38.79 kg/m²      HgbA1C (%)   Date Value    1. Follow recommended meal plan. 2. Test BG fasting and 2 hours post meals 2 times/day. 3. Bring glucose logs/meter to all provider visits for review. 4. Continue same doses of Metformin   5. Get dilated eye exam   5.  Encouraged Ulises Reddy to

## 2020-12-17 ENCOUNTER — TELEPHONE (OUTPATIENT)
Dept: FAMILY MEDICINE CLINIC | Facility: CLINIC | Age: 45
End: 2020-12-17

## 2020-12-17 DIAGNOSIS — E78.2 MIXED HYPERLIPIDEMIA: Primary | ICD-10-CM

## 2020-12-17 NOTE — TELEPHONE ENCOUNTER
Diabetic Medication Protocol Upwbak7712/17/2020 03:05 AM   HgBA1C procedure resulted in past 6 months Protocol Details    Last HgBA1C < 7.5     Microalbumin procedure in past 12 months or taking ACE/ARB     Appointment in past 6 or next 3 months      LOV 9/12/2020  LR 10/1/2020   Sig changed to twice daily.    Last Hgb A1c=6.7 on 9/16/2019  Micro 9/12/2020

## 2020-12-17 NOTE — TELEPHONE ENCOUNTER
Letter mailed to patient reminding him he is due for labs.     Lab Frequency Next Occurrence   LIPID PANEL Once 12/17/2020

## 2021-01-29 DIAGNOSIS — E11.9 TYPE 2 DIABETES MELLITUS WITHOUT COMPLICATION, WITHOUT LONG-TERM CURRENT USE OF INSULIN (HCC): ICD-10-CM

## 2021-01-29 RX ORDER — BLOOD SUGAR DIAGNOSTIC
STRIP MISCELLANEOUS
Qty: 200 STRIP | Refills: 3 | Status: SHIPPED | OUTPATIENT
Start: 2021-01-29

## 2021-03-30 ENCOUNTER — TELEPHONE (OUTPATIENT)
Dept: FAMILY MEDICINE CLINIC | Facility: CLINIC | Age: 46
End: 2021-03-30

## 2021-03-30 DIAGNOSIS — R73.09 ELEVATED GLUCOSE: Primary | ICD-10-CM

## 2021-04-03 ENCOUNTER — NURSE ONLY (OUTPATIENT)
Dept: FAMILY MEDICINE CLINIC | Facility: CLINIC | Age: 46
End: 2021-04-03
Payer: COMMERCIAL

## 2021-04-03 DIAGNOSIS — R73.09 ELEVATED GLUCOSE: ICD-10-CM

## 2021-04-03 DIAGNOSIS — E78.2 MIXED HYPERLIPIDEMIA: ICD-10-CM

## 2021-04-03 PROCEDURE — 83036 HEMOGLOBIN GLYCOSYLATED A1C: CPT | Performed by: FAMILY MEDICINE

## 2021-04-03 PROCEDURE — 36415 COLL VENOUS BLD VENIPUNCTURE: CPT | Performed by: FAMILY MEDICINE

## 2021-04-03 PROCEDURE — 80061 LIPID PANEL: CPT | Performed by: FAMILY MEDICINE

## 2021-04-03 NOTE — PROGRESS NOTES
Patient presented for lab work ordered by Nimisha Nair. One mint and one lavender tubes drawn with a butterfly needle in left AC space. Pt tolerated procedure well.

## 2021-04-06 ENCOUNTER — TELEPHONE (OUTPATIENT)
Dept: FAMILY MEDICINE CLINIC | Facility: CLINIC | Age: 46
End: 2021-04-06

## 2021-04-06 DIAGNOSIS — E11.9 TYPE 2 DIABETES MELLITUS WITHOUT COMPLICATION, WITHOUT LONG-TERM CURRENT USE OF INSULIN (HCC): Primary | ICD-10-CM

## 2021-04-06 NOTE — TELEPHONE ENCOUNTER
----- Message from Jenn Mai DO sent at 4/6/2021  9:55 AM CDT -----  Can notify Amrita Guy his A1c is up a bit to 7.5, not surprised coming out this winter, his cholesterol profile looks better, his TG are better as well.  Lets keep everything the same lets

## 2021-04-26 ENCOUNTER — NURSE ONLY (OUTPATIENT)
Dept: ENDOCRINOLOGY CLINIC | Facility: CLINIC | Age: 46
End: 2021-04-26
Payer: COMMERCIAL

## 2021-04-26 DIAGNOSIS — E11.9 TYPE 2 DIABETES MELLITUS WITHOUT COMPLICATION, WITHOUT LONG-TERM CURRENT USE OF INSULIN (HCC): Primary | ICD-10-CM

## 2021-04-26 PROCEDURE — G0108 DIAB MANAGE TRN  PER INDIV: HCPCS | Performed by: DIETITIAN, REGISTERED

## 2021-04-27 VITALS — BODY MASS INDEX: 38 KG/M2 | WEIGHT: 283 LBS

## 2021-04-27 NOTE — PROGRESS NOTES
Jack Noe : 1975 was seen for Diabetic Education Follow up:    Date: 21 Referring Provider: Dr. Gerhard Cole  Start time: 4:30pm End time: 5:00pm    Assessment:     Diagnosis: Uncontrolled Type 2    Reason for visit: 3 month follow-up    Change

## 2021-07-19 RX ORDER — PRAVASTATIN SODIUM 10 MG
TABLET ORAL
Qty: 30 TABLET | Refills: 8 | Status: SHIPPED | OUTPATIENT
Start: 2021-07-19 | End: 2022-01-08

## 2021-07-19 NOTE — TELEPHONE ENCOUNTER
LOV: 9/12/20  Last Refill:  10/27/20 #90 2 RF    Future Appointments   Date Time Provider Kacie Sosa   7/26/2021  4:30 PM Toney Rodas RN, CDE [facility] DIAB [facility]       Lab Results   Component Value Date    CHOLEST 136 04/03/2021    TRIG

## 2021-07-29 RX ORDER — LOSARTAN POTASSIUM 50 MG/1
50 TABLET ORAL DAILY
Qty: 90 TABLET | Refills: 2 | Status: SHIPPED | OUTPATIENT
Start: 2021-07-29 | End: 2022-05-07

## 2021-09-22 ENCOUNTER — TELEPHONE (OUTPATIENT)
Dept: FAMILY MEDICINE CLINIC | Facility: CLINIC | Age: 46
End: 2021-09-22

## 2021-10-23 ENCOUNTER — TELEPHONE (OUTPATIENT)
Dept: FAMILY MEDICINE CLINIC | Facility: CLINIC | Age: 46
End: 2021-10-23

## 2021-10-23 NOTE — TELEPHONE ENCOUNTER
Last visit 09/12/2020  Last refill 12/17/2020  MePlease message sent to patient to let him know he is due to come in for a office visit.    Diabetic Medication Protocol Failed 10/23/2021 12:17 AM   Protocol Details  HgBA1C procedure resulted in past 6 months    Last HgBA1C < 7.5    Appointment in past 6 or next 3 months    Microalbumin procedure in past 12 months or taking ACE/ARB

## 2021-11-09 ENCOUNTER — TELEPHONE (OUTPATIENT)
Dept: FAMILY MEDICINE CLINIC | Facility: CLINIC | Age: 46
End: 2021-11-09

## 2021-11-26 NOTE — TELEPHONE ENCOUNTER
Diabetic Medication Protocol Failed 11/26/2021 12:27 AM   Protocol Details  HgBA1C procedure resulted in past 6 months    Last HgBA1C < 7.5    Appointment in past 6 or next 3 months    Microalbumin procedure in past 12 months or taking ACE/ARB     Last OV

## 2022-01-08 NOTE — TELEPHONE ENCOUNTER
Cholesterol Medication Protocol Failed 01/08/2022 10:35 AM   Protocol Details  ALT < 80    ALT resulted within past year    Appointment within past 12 or next 3 months    Lipid panel within past 12 months      Diabetic Medication Protocol Failed 01/08/2022

## 2022-01-09 RX ORDER — PRAVASTATIN SODIUM 10 MG
10 TABLET ORAL NIGHTLY
Qty: 90 TABLET | Refills: 2 | Status: SHIPPED | OUTPATIENT
Start: 2022-01-09 | End: 2022-04-09

## 2022-05-07 RX ORDER — LOSARTAN POTASSIUM 50 MG/1
TABLET ORAL
Qty: 90 TABLET | Refills: 3 | Status: SHIPPED | OUTPATIENT
Start: 2022-05-07

## 2022-05-07 NOTE — TELEPHONE ENCOUNTER
Last refilled 7/29/21 for #90 with 2 RF  LOV with DS 9/12/20  No future appt with pcp  Last CMP 9/12/20     Hypertension Medications Protocol Failed 05/06/2022 07:25 PM   Protocol Details  CMP or BMP in past 12 months    Appointment in past 6 or next 3 months    Last serum creatinine< 2.0

## 2022-07-29 ENCOUNTER — PATIENT OUTREACH (OUTPATIENT)
Dept: FAMILY MEDICINE CLINIC | Facility: CLINIC | Age: 47
End: 2022-07-29

## 2022-09-19 RX ORDER — PRAVASTATIN SODIUM 10 MG
TABLET ORAL
Qty: 90 TABLET | Refills: 3 | Status: SHIPPED | OUTPATIENT
Start: 2022-09-19

## 2024-02-28 ENCOUNTER — OFFICE VISIT (OUTPATIENT)
Dept: FAMILY MEDICINE CLINIC | Facility: CLINIC | Age: 49
End: 2024-02-28
Payer: COMMERCIAL

## 2024-02-28 VITALS
DIASTOLIC BLOOD PRESSURE: 86 MMHG | BODY MASS INDEX: 31.78 KG/M2 | HEART RATE: 80 BPM | TEMPERATURE: 98 F | WEIGHT: 245 LBS | OXYGEN SATURATION: 96 % | RESPIRATION RATE: 16 BRPM | SYSTOLIC BLOOD PRESSURE: 134 MMHG | HEIGHT: 73.5 IN

## 2024-02-28 DIAGNOSIS — H92.01 OTALGIA OF RIGHT EAR: Primary | ICD-10-CM

## 2024-02-28 PROCEDURE — 99213 OFFICE O/P EST LOW 20 MIN: CPT | Performed by: PHYSICIAN ASSISTANT

## 2024-02-28 PROCEDURE — 3075F SYST BP GE 130 - 139MM HG: CPT | Performed by: PHYSICIAN ASSISTANT

## 2024-02-28 PROCEDURE — 3079F DIAST BP 80-89 MM HG: CPT | Performed by: PHYSICIAN ASSISTANT

## 2024-02-28 PROCEDURE — 3008F BODY MASS INDEX DOCD: CPT | Performed by: PHYSICIAN ASSISTANT

## 2024-02-28 RX ORDER — NEOMYCIN SULFATE, POLYMYXIN B SULFATE AND HYDROCORTISONE 10; 3.5; 1 MG/ML; MG/ML; [USP'U]/ML
4 SUSPENSION/ DROPS AURICULAR (OTIC) 4 TIMES DAILY
Qty: 1 EACH | Refills: 0 | Status: SHIPPED | OUTPATIENT
Start: 2024-02-28 | End: 2024-03-06

## 2024-02-29 NOTE — PROGRESS NOTES
CHIEF COMPLAINT:     Chief Complaint   Patient presents with    Ear Pain     1 day, right ear, not hearing well, pain is just starting       HPI:     William Gupta is a 48 year old male who presents to clinic today with complaints of right ear pain.  Wearing ear buds today  and noted could not hear as well when he removed them.  He has developed some ear pain in the last couple of hours.   No ringing or dizziness.  No tooth pain or problems chewing.  No  ear  drainage.  Did recently have a cold. Patient denies history of ear infections.   Home treatment includes none.         Current Outpatient Medications   Medication Sig Dispense Refill    neomycin-polymyxin-hydrocortisone 3.5-21598-6 Otic Suspension Place 4 drops into the right ear 4 (four) times daily for 7 days. 1 each 0    PRAVASTATIN 10 MG Oral Tab TAKE 1 TABLET NIGHTLY (APPOINTMENT NEEDED BEFORE FURTHER REFILLS. PLEASE CALL [phone] TO SCHEDULE) (Patient not taking: Reported on 2/28/2024) 90 tablet 3    LOSARTAN 50 MG Oral Tab TAKE 1 TABLET DAILY (Patient not taking: Reported on 2/28/2024) 90 tablet 3    metFORMIN 500 MG Oral Tab TAKE 1 TABLET BY MOUTH TWICE A DAY. Appointment needed before further refills. Please call [phone] to schedule. (Patient not taking: Reported on 2/28/2024) 180 tablet 2    Glucose Blood (ONETOUCH VERIO) In Vitro Strip Test twice daily (Patient not taking: Reported on 2/28/2024) 200 strip 3    OneTouch Delica Lancets 30G Does not apply Misc 1 each by Does not apply route 2 (two) times daily. (Patient not taking: Reported on 2/28/2024) 200 each 3    acyclovir 200 MG Oral Cap TAKE 4 CAPSULES DAILY (Patient not taking: Reported on 2/28/2024) 360 capsule 3    alprazolam (XANAX) 0.25 MG Oral Tab Take one tab 2 hours prior to flight.  May repeat one time 1 hour prior to flight. (Patient not taking: Reported on 2/28/2024) 6 tablet 0      Past Medical History:   Diagnosis Date    Other and unspecified hyperlipidemia     Unspecified  essential hypertension       Social History:  Social History     Socioeconomic History    Marital status:    Tobacco Use    Smoking status: Never    Smokeless tobacco: Never   Vaping Use    Vaping Use: Never used   Substance and Sexual Activity    Alcohol use: Yes     Comment: occ    Drug use: No        REVIEW OF SYSTEMS:     Positive for stated complaint: right ear pain.   Pertinent positives and negatives noted in the the HPI.      EXAM:   /86   Pulse 80   Temp 97.7 °F (36.5 °C)   Resp 16   Ht 6' 1.5\" (1.867 m)   Wt 245 lb (111.1 kg)   SpO2 96%   BMI 31.89 kg/m²   GENERAL: WNWD NAD  SKIN: no rashes,no suspicious lesions  HEAD: atraumatic, normocephalic  EYES: conjunctiva clear, sclera non icteric  EARS: +  tender with manipulation of tragus on the right.. also sore with speculm insertion on the right. External auditory canals are patent without wax.  No gross swelling or discharge noted.   Right TM: non erythematous  Left TM: non erythematous.  NOSE: nares patent, nasal mucosa mild congestion  THROAT: mucosa moist, Posterior pharynx is not erythematous or injected. No exudates.  NECK: supple, non-tender  LUNGS: CTA without R/R/W  CARDIO: S1/S2 RRR  EXTREMITIES: no cyanosis, clubbing or edema  LYMPH: no pre or post auricular lymphadenopathy.        No results found for this or any previous visit (from the past 24 hour(s)).      ASSESSMENT AND PLAN:   William Gupta is a 48 year old male who presents with:    ASSESSMENT:  Encounter Diagnosis   Name Primary?    Otalgia of right ear Yes       PLAN: trial of cortisporin gtts.  He may have some early OE developing from wearing ear buds?  Also advise starting flonase as he may have  some element  of eustachian tube issue contribuing to pain and fullness.  I see no evidence of acute OM.   Avoid decongestant since currently stopped all of his medications including his BP med.    Meds as listed below.  Comfort measures as described in Patient  Instructions    Meds & Refills for this Visit:  Requested Prescriptions     Signed Prescriptions Disp Refills    neomycin-polymyxin-hydrocortisone 3.5-25159-9 Otic Suspension 1 each 0     Sig: Place 4 drops into the right ear 4 (four) times daily for 7 days.         Risk and benefits of medication discussed. Stressed importance of completing full course of antibiotic. Tylenol/Motrin prn pain.      Call or follow up with pcp if s/sx worsen, do not improve in 3 days, or if fever of 100.4 or greater persists for 72 hours.    Patient voiced understand and is in agreement with treatment plan.

## (undated) NOTE — LETTER
Ulises Reddy  [address]  [place] [address]    12/17/2020      Dear  Ulises Reddy    In order to provide the highest quality care, [facility] uses a sophisticated computer system to track our patient's catherine

## (undated) NOTE — LETTER
[facility] [address]  [place] [address]  [address] [place] [address]      07/29/22      According to our records you are due for your annual visit with fasting labs with Dr. Beverley Mathis. Regular visits are very important to your health. If you are seeing a different primary care physician, please let us know so that we may update our records accordingly. Please contact our office at [phone] to schedule an appointment. Thank you.    [id number] [address]

## (undated) NOTE — MR AVS SNAPSHOT
[address] [address] [phone]               Thank you for choosing us for your health care visit with Tenzin Christensen DO.   We are glad to serve you and happy to provide you with this sum TSH [E]    Complete by:  Feb 11, 2017 (Approximate)    Assoc Dx:  Routine health maintenance [Z00.00]           Gastro Referral - In Network    Complete by:  As directed    Assoc Dx:  Family history of malignant neoplasm of colon in first degree relative visit,  view other health information, and more. To sign up or find more information, go to [email or web address] and click on the Sign Up Now link in the [employer] box.      Enter your MO[employer] Activation Code exactly as it appears below along with yo Don’t forget strength training with weights and resistance Set goals and track your progress   You don’t need to join a gym. Home exercises work great.  Put more priority on exercise in your life                    Visit [facility] online at

## (undated) NOTE — LETTER
11/20/18        [facility]  [address]  [place] [address]      Dear [facility],    [facility] records indicate that you have outstanding lab work and or testing that was ordered for you and has not yet been completed:  Lab Frequency Next Occurrence   HE

## (undated) NOTE — LETTER
[facility]  [address] [place] [address]    10/22/2018      Dear  [facility]    In order to provide the highest quality care, JEFFY Giraldo uses a sophisticated computer system to track our patient's catherine

## (undated) NOTE — Clinical Note
[facility], [facility], [address] [place] [address] Brittany Saravia [facility] [address]  [phone]        5/15/2017        Coleen Mcduffie  [address]  [place] [address]      Dear Coleen Mcduffie,      To help us provide th

## (undated) NOTE — LETTER
01/16/21 April [facility]   [address]  [place] [address]           Dear Justin Kirby records indicate that you have outstanding lab work and or testing that was ordered for you and has not yet been completed:  Lab Frequency Next Oc